# Patient Record
Sex: MALE | Race: WHITE | NOT HISPANIC OR LATINO | URBAN - METROPOLITAN AREA
[De-identification: names, ages, dates, MRNs, and addresses within clinical notes are randomized per-mention and may not be internally consistent; named-entity substitution may affect disease eponyms.]

---

## 2017-07-16 ENCOUNTER — EMERGENCY (EMERGENCY)
Facility: HOSPITAL | Age: 67
LOS: 1 days | End: 2017-07-16
Payer: MEDICARE

## 2017-07-16 PROCEDURE — 99283 EMERGENCY DEPT VISIT LOW MDM: CPT

## 2017-10-08 ENCOUNTER — EMERGENCY (EMERGENCY)
Facility: HOSPITAL | Age: 67
LOS: 1 days | End: 2017-10-08
Payer: MEDICARE

## 2017-10-08 PROCEDURE — 99284 EMERGENCY DEPT VISIT MOD MDM: CPT | Mod: 25

## 2017-10-08 PROCEDURE — 72125 CT NECK SPINE W/O DYE: CPT | Mod: 26

## 2017-10-08 PROCEDURE — 12002 RPR S/N/AX/GEN/TRNK2.6-7.5CM: CPT

## 2017-10-08 PROCEDURE — 70450 CT HEAD/BRAIN W/O DYE: CPT | Mod: 26

## 2017-10-15 ENCOUNTER — EMERGENCY (EMERGENCY)
Facility: HOSPITAL | Age: 67
LOS: 1 days | End: 2017-10-15
Payer: MEDICARE

## 2017-10-15 PROCEDURE — 99285 EMERGENCY DEPT VISIT HI MDM: CPT

## 2017-10-15 PROCEDURE — 70450 CT HEAD/BRAIN W/O DYE: CPT | Mod: 26

## 2018-06-21 ENCOUNTER — APPOINTMENT (OUTPATIENT)
Dept: UROLOGY | Facility: CLINIC | Age: 68
End: 2018-06-21
Payer: MEDICARE

## 2018-06-21 VITALS
SYSTOLIC BLOOD PRESSURE: 143 MMHG | OXYGEN SATURATION: 96 % | DIASTOLIC BLOOD PRESSURE: 81 MMHG | TEMPERATURE: 97.8 F | HEART RATE: 49 BPM

## 2018-06-21 VITALS — HEIGHT: 72 IN | WEIGHT: 210 LBS | BODY MASS INDEX: 28.44 KG/M2

## 2018-06-21 DIAGNOSIS — R97.20 ELEVATED PROSTATE, SPECIFIC ANTIGEN [PSA]: ICD-10-CM

## 2018-06-21 PROBLEM — Z00.00 ENCOUNTER FOR PREVENTIVE HEALTH EXAMINATION: Status: ACTIVE | Noted: 2018-06-21

## 2018-06-21 PROCEDURE — 99204 OFFICE O/P NEW MOD 45 MIN: CPT

## 2018-06-25 LAB — BACTERIA UR CULT: NORMAL

## 2018-06-26 ENCOUNTER — FORM ENCOUNTER (OUTPATIENT)
Age: 68
End: 2018-06-26

## 2018-06-26 ENCOUNTER — OUTPATIENT (OUTPATIENT)
Dept: OUTPATIENT SERVICES | Facility: HOSPITAL | Age: 68
LOS: 1 days | End: 2018-06-26
Payer: MEDICARE

## 2018-06-26 ENCOUNTER — APPOINTMENT (OUTPATIENT)
Dept: MRI IMAGING | Facility: CLINIC | Age: 68
End: 2018-06-26
Payer: MEDICARE

## 2018-06-26 DIAGNOSIS — R97.20 ELEVATED PROSTATE SPECIFIC ANTIGEN [PSA]: ICD-10-CM

## 2018-06-26 LAB — PSA SERPL-MCNC: 5.38 NG/ML

## 2018-06-27 PROCEDURE — A9585: CPT

## 2018-06-27 PROCEDURE — 72197 MRI PELVIS W/O & W/DYE: CPT

## 2018-06-27 PROCEDURE — 82565 ASSAY OF CREATININE: CPT

## 2018-06-27 PROCEDURE — 72197 MRI PELVIS W/O & W/DYE: CPT | Mod: 26

## 2020-03-20 ENCOUNTER — APPOINTMENT (OUTPATIENT)
Dept: UROLOGY | Facility: CLINIC | Age: 70
End: 2020-03-20

## 2020-05-01 ENCOUNTER — APPOINTMENT (OUTPATIENT)
Dept: UROLOGY | Facility: CLINIC | Age: 70
End: 2020-05-01

## 2020-05-29 ENCOUNTER — APPOINTMENT (OUTPATIENT)
Dept: UROLOGY | Facility: CLINIC | Age: 70
End: 2020-05-29
Payer: MEDICARE

## 2020-05-29 VITALS
BODY MASS INDEX: 28.44 KG/M2 | WEIGHT: 210 LBS | HEART RATE: 64 BPM | SYSTOLIC BLOOD PRESSURE: 147 MMHG | DIASTOLIC BLOOD PRESSURE: 72 MMHG | TEMPERATURE: 97.8 F | HEIGHT: 72 IN

## 2020-05-29 PROCEDURE — 99214 OFFICE O/P EST MOD 30 MIN: CPT

## 2020-05-29 NOTE — PHYSICAL EXAM
[General Appearance - Well Developed] : well developed [General Appearance - Well Nourished] : well nourished [Normal Appearance] : normal appearance [Well Groomed] : well groomed [General Appearance - In No Acute Distress] : no acute distress [Abdomen Soft] : soft [Abdomen Tenderness] : non-tender [Urethral Meatus] : meatus normal [Costovertebral Angle Tenderness] : no ~M costovertebral angle tenderness [Urinary Bladder Findings] : the bladder was normal on palpation [Penis Abnormality] : normal circumcised penis [Epididymis] : the epididymides were normal [Scrotum] : the scrotum was normal [Testes Tenderness] : no tenderness of the testes [Testes Mass (___cm)] : there were no testicular masses [Prostate Tenderness] : the prostate was not tender [No Prostate Nodules] : no prostate nodules [Prostate Size ___ gm] : prostate size [unfilled] gm [] : no respiratory distress [Edema] : no peripheral edema [Respiration, Rhythm And Depth] : normal respiratory rhythm and effort [Oriented To Time, Place, And Person] : oriented to person, place, and time [Exaggerated Use Of Accessory Muscles For Inspiration] : no accessory muscle use [Affect] : the affect was normal [Mood] : the mood was normal [Not Anxious] : not anxious [No Palpable Adenopathy] : no palpable adenopathy

## 2020-05-29 NOTE — HISTORY OF PRESENT ILLNESS
[FreeTextEntry1] : CC: Elevated PSA \par \par HPI:\par \par PSA follow up 6.4 ng/ml one week ago;  assuming 68 cc the density 0.09 \par PSA 5.44 ng/ml in January of 2018\par Prior negative biopsy \par MRI PIRAD1, 68 cc, 2018\par \par Associated symptoms: nocturia\par \par FAMHX: negative CAP \par SURGHX: knee and wrist \par SOCIAL: nonsmoker, semi-retired real estate sales, lives in [place] for his whole life\par ROS: Negative GI, RESP, NEURO, ORTHO except arthritis

## 2021-04-29 ENCOUNTER — APPOINTMENT (OUTPATIENT)
Dept: ULTRASOUND IMAGING | Facility: CLINIC | Age: 71
End: 2021-04-29
Payer: MEDICARE

## 2021-04-29 PROCEDURE — 76700 US EXAM ABDOM COMPLETE: CPT

## 2022-11-18 ENCOUNTER — OFFICE (OUTPATIENT)
Dept: URBAN - METROPOLITAN AREA CLINIC 38 | Facility: CLINIC | Age: 72
Setting detail: OPHTHALMOLOGY
End: 2022-11-18
Payer: MEDICARE

## 2022-11-18 DIAGNOSIS — H11.153: ICD-10-CM

## 2022-11-18 DIAGNOSIS — H02.34: ICD-10-CM

## 2022-11-18 DIAGNOSIS — H02.014: ICD-10-CM

## 2022-11-18 DIAGNOSIS — H02.884: ICD-10-CM

## 2022-11-18 DIAGNOSIS — H02.31: ICD-10-CM

## 2022-11-18 PROCEDURE — 99213 OFFICE O/P EST LOW 20 MIN: CPT | Performed by: OPHTHALMOLOGY

## 2022-11-18 PROCEDURE — 67820 REVISE EYELASHES: CPT | Performed by: OPHTHALMOLOGY

## 2022-11-18 ASSESSMENT — REFRACTION_AUTOREFRACTION
OS_AXIS: 093
OD_AXIS: 096
OS_SPHERE: +0.75
OS_CYLINDER: -1.25
OD_SPHERE: +0.50
OD_CYLINDER: -0.75

## 2022-11-18 ASSESSMENT — AXIALLENGTH_DERIVED
OS_AL: 24.4454
OD_AL: 24.3962
OS_AL: 24.4454
OD_AL: 24.3962

## 2022-11-18 ASSESSMENT — LID EXAM ASSESSMENTS
OS_TRICHIASIS: LUL 1+
OD_COMMENTS: BLEPHAROCHALASIS WITH HOODING RUL

## 2022-11-18 ASSESSMENT — SPHEQUIV_DERIVED
OS_SPHEQUIV: 0.125
OS_SPHEQUIV: 0.125
OD_SPHEQUIV: 0.125
OD_SPHEQUIV: 0.125

## 2022-11-18 ASSESSMENT — REFRACTION_MANIFEST
OU_VA: 20/20-
OD_VA1: 20/25-2
OS_AXIS: 095
OS_SPHERE: +0.50
OS_ADD: +2.25
OD_ADD: +2.25
OD_AXIS: 095
OD_CYLINDER: -0.75
OD_VA2: 20/20
OS_VA2: 20/20
OS_CYLINDER: -0.75
OS_VA1: 20/20-
OD_SPHERE: +0.50

## 2022-11-18 ASSESSMENT — KERATOMETRY
OS_K1POWER_DIOPTERS: 40.50
OS_K2POWER_DIOPTERS: 41.75
OD_AXISANGLE_DEGREES: 179
OS_AXISANGLE_DEGREES: 008
OD_K2POWER_DIOPTERS: 41.75
OD_K1POWER_DIOPTERS: 40.75
METHOD_AUTO_MANUAL: AUTO

## 2022-11-18 ASSESSMENT — VISUAL ACUITY
OD_BCVA: 20/40-
OS_BCVA: 20/30-2

## 2022-11-18 ASSESSMENT — CONFRONTATIONAL VISUAL FIELD TEST (CVF)
OD_FINDINGS: FULL
OS_FINDINGS: FULL

## 2023-01-26 ENCOUNTER — APPOINTMENT (OUTPATIENT)
Dept: MRI IMAGING | Facility: CLINIC | Age: 73
End: 2023-01-26
Payer: MEDICARE

## 2023-01-26 ENCOUNTER — OUTPATIENT (OUTPATIENT)
Dept: OUTPATIENT SERVICES | Facility: HOSPITAL | Age: 73
LOS: 1 days | End: 2023-01-26
Payer: MEDICARE

## 2023-01-26 DIAGNOSIS — R97.20 ELEVATED PROSTATE SPECIFIC ANTIGEN [PSA]: ICD-10-CM

## 2023-01-26 DIAGNOSIS — Z00.8 ENCOUNTER FOR OTHER GENERAL EXAMINATION: ICD-10-CM

## 2023-01-26 PROCEDURE — 72197 MRI PELVIS W/O & W/DYE: CPT | Mod: MH

## 2023-01-26 PROCEDURE — 76498P: CUSTOM | Mod: 26,MH

## 2023-01-26 PROCEDURE — 76498 UNLISTED MR PROCEDURE: CPT

## 2023-01-26 PROCEDURE — 72197 MRI PELVIS W/O & W/DYE: CPT | Mod: 26,MH

## 2023-01-26 PROCEDURE — A9585: CPT

## 2023-03-10 ENCOUNTER — APPOINTMENT (OUTPATIENT)
Dept: CT IMAGING | Facility: CLINIC | Age: 73
End: 2023-03-10

## 2023-03-20 ENCOUNTER — APPOINTMENT (OUTPATIENT)
Dept: CT IMAGING | Facility: CLINIC | Age: 73
End: 2023-03-20
Payer: MEDICARE

## 2023-03-20 PROCEDURE — 74176 CT ABD & PELVIS W/O CONTRAST: CPT | Mod: MH

## 2023-09-07 ENCOUNTER — APPOINTMENT (OUTPATIENT)
Dept: MRI IMAGING | Facility: CLINIC | Age: 73
End: 2023-09-07

## 2024-04-17 ENCOUNTER — APPOINTMENT (OUTPATIENT)
Dept: MRI IMAGING | Facility: CLINIC | Age: 74
End: 2024-04-17
Payer: MEDICARE

## 2024-04-17 PROCEDURE — 73221 MRI JOINT UPR EXTREM W/O DYE: CPT | Mod: RT,MH

## 2024-08-05 ENCOUNTER — APPOINTMENT (OUTPATIENT)
Dept: CT IMAGING | Facility: CLINIC | Age: 74
End: 2024-08-05

## 2024-08-05 PROCEDURE — 74178 CT ABD&PLV WO CNTR FLWD CNTR: CPT | Mod: MH

## 2024-10-15 ENCOUNTER — APPOINTMENT (OUTPATIENT)
Dept: MRI IMAGING | Facility: CLINIC | Age: 74
End: 2024-10-15
Payer: MEDICARE

## 2024-10-15 PROCEDURE — 72148 MRI LUMBAR SPINE W/O DYE: CPT | Mod: MH

## 2024-11-09 ENCOUNTER — EMERGENCY (EMERGENCY)
Facility: HOSPITAL | Age: 74
LOS: 1 days | Discharge: HOME | End: 2024-11-09
Attending: EMERGENCY MEDICINE
Payer: MEDICARE

## 2024-11-09 ENCOUNTER — NON-APPOINTMENT (OUTPATIENT)
Age: 74
End: 2024-11-09

## 2024-11-09 VITALS
SYSTOLIC BLOOD PRESSURE: 127 MMHG | OXYGEN SATURATION: 95 % | DIASTOLIC BLOOD PRESSURE: 68 MMHG | RESPIRATION RATE: 16 BRPM | TEMPERATURE: 98 F | WEIGHT: 179.9 LBS | HEART RATE: 64 BPM

## 2024-11-09 VITALS
HEART RATE: 62 BPM | OXYGEN SATURATION: 97 % | RESPIRATION RATE: 16 BRPM | DIASTOLIC BLOOD PRESSURE: 68 MMHG | SYSTOLIC BLOOD PRESSURE: 134 MMHG

## 2024-11-09 LAB
ALBUMIN SERPL ELPH-MCNC: 3.8 G/DL — SIGNIFICANT CHANGE UP (ref 3.3–5.2)
ALP SERPL-CCNC: 55 U/L — SIGNIFICANT CHANGE UP (ref 40–120)
ALT FLD-CCNC: 15 U/L — SIGNIFICANT CHANGE UP
ANION GAP SERPL CALC-SCNC: 15 MMOL/L — SIGNIFICANT CHANGE UP (ref 5–17)
APTT BLD: 36.3 SEC — HIGH (ref 24.5–35.6)
AST SERPL-CCNC: 19 U/L — SIGNIFICANT CHANGE UP
BASOPHILS # BLD AUTO: 0.03 K/UL — SIGNIFICANT CHANGE UP (ref 0–0.2)
BASOPHILS NFR BLD AUTO: 0.5 % — SIGNIFICANT CHANGE UP (ref 0–2)
BILIRUB SERPL-MCNC: 0.2 MG/DL — LOW (ref 0.4–2)
BUN SERPL-MCNC: 11.6 MG/DL — SIGNIFICANT CHANGE UP (ref 8–20)
CALCIUM SERPL-MCNC: 8.6 MG/DL — SIGNIFICANT CHANGE UP (ref 8.4–10.5)
CHLORIDE SERPL-SCNC: 107 MMOL/L — SIGNIFICANT CHANGE UP (ref 96–108)
CO2 SERPL-SCNC: 21 MMOL/L — LOW (ref 22–29)
CREAT SERPL-MCNC: 0.74 MG/DL — SIGNIFICANT CHANGE UP (ref 0.5–1.3)
EGFR: 94 ML/MIN/1.73M2 — SIGNIFICANT CHANGE UP
EOSINOPHIL # BLD AUTO: 0.2 K/UL — SIGNIFICANT CHANGE UP (ref 0–0.5)
EOSINOPHIL NFR BLD AUTO: 3 % — SIGNIFICANT CHANGE UP (ref 0–6)
GLUCOSE SERPL-MCNC: 110 MG/DL — HIGH (ref 70–99)
HCT VFR BLD CALC: 41.4 % — SIGNIFICANT CHANGE UP (ref 39–50)
HGB BLD-MCNC: 14.3 G/DL — SIGNIFICANT CHANGE UP (ref 13–17)
IMM GRANULOCYTES NFR BLD AUTO: 0.3 % — SIGNIFICANT CHANGE UP (ref 0–0.9)
INR BLD: 0.95 RATIO — SIGNIFICANT CHANGE UP (ref 0.85–1.16)
LYMPHOCYTES # BLD AUTO: 2.06 K/UL — SIGNIFICANT CHANGE UP (ref 1–3.3)
LYMPHOCYTES # BLD AUTO: 31 % — SIGNIFICANT CHANGE UP (ref 13–44)
MCHC RBC-ENTMCNC: 31.8 PG — SIGNIFICANT CHANGE UP (ref 27–34)
MCHC RBC-ENTMCNC: 34.5 G/DL — SIGNIFICANT CHANGE UP (ref 32–36)
MCV RBC AUTO: 92.2 FL — SIGNIFICANT CHANGE UP (ref 80–100)
MONOCYTES # BLD AUTO: 0.63 K/UL — SIGNIFICANT CHANGE UP (ref 0–0.9)
MONOCYTES NFR BLD AUTO: 9.5 % — SIGNIFICANT CHANGE UP (ref 2–14)
NEUTROPHILS # BLD AUTO: 3.7 K/UL — SIGNIFICANT CHANGE UP (ref 1.8–7.4)
NEUTROPHILS NFR BLD AUTO: 55.7 % — SIGNIFICANT CHANGE UP (ref 43–77)
PLATELET # BLD AUTO: 189 K/UL — SIGNIFICANT CHANGE UP (ref 150–400)
POTASSIUM SERPL-MCNC: 3.8 MMOL/L — SIGNIFICANT CHANGE UP (ref 3.5–5.3)
POTASSIUM SERPL-SCNC: 3.8 MMOL/L — SIGNIFICANT CHANGE UP (ref 3.5–5.3)
PROT SERPL-MCNC: 6.1 G/DL — LOW (ref 6.6–8.7)
PROTHROM AB SERPL-ACNC: 11 SEC — SIGNIFICANT CHANGE UP (ref 9.9–13.4)
RBC # BLD: 4.49 M/UL — SIGNIFICANT CHANGE UP (ref 4.2–5.8)
RBC # FLD: 11.7 % — SIGNIFICANT CHANGE UP (ref 10.3–14.5)
SODIUM SERPL-SCNC: 143 MMOL/L — SIGNIFICANT CHANGE UP (ref 135–145)
TROPONIN T, HIGH SENSITIVITY RESULT: 10 NG/L — SIGNIFICANT CHANGE UP (ref 0–51)
TROPONIN T, HIGH SENSITIVITY RESULT: 11 NG/L — SIGNIFICANT CHANGE UP (ref 0–51)
WBC # BLD: 6.64 K/UL — SIGNIFICANT CHANGE UP (ref 3.8–10.5)
WBC # FLD AUTO: 6.64 K/UL — SIGNIFICANT CHANGE UP (ref 3.8–10.5)

## 2024-11-09 PROCEDURE — 70450 CT HEAD/BRAIN W/O DYE: CPT | Mod: MC

## 2024-11-09 PROCEDURE — 71045 X-RAY EXAM CHEST 1 VIEW: CPT

## 2024-11-09 PROCEDURE — 85025 COMPLETE CBC W/AUTO DIFF WBC: CPT

## 2024-11-09 PROCEDURE — 93005 ELECTROCARDIOGRAM TRACING: CPT

## 2024-11-09 PROCEDURE — 99285 EMERGENCY DEPT VISIT HI MDM: CPT | Mod: 25

## 2024-11-09 PROCEDURE — 93010 ELECTROCARDIOGRAM REPORT: CPT

## 2024-11-09 PROCEDURE — 70498 CT ANGIOGRAPHY NECK: CPT | Mod: 26,MC

## 2024-11-09 PROCEDURE — 72170 X-RAY EXAM OF PELVIS: CPT | Mod: 26

## 2024-11-09 PROCEDURE — 80053 COMPREHEN METABOLIC PANEL: CPT

## 2024-11-09 PROCEDURE — 36415 COLL VENOUS BLD VENIPUNCTURE: CPT

## 2024-11-09 PROCEDURE — 85730 THROMBOPLASTIN TIME PARTIAL: CPT

## 2024-11-09 PROCEDURE — 84484 ASSAY OF TROPONIN QUANT: CPT

## 2024-11-09 PROCEDURE — 70450 CT HEAD/BRAIN W/O DYE: CPT | Mod: 26,MC,XU

## 2024-11-09 PROCEDURE — 70496 CT ANGIOGRAPHY HEAD: CPT | Mod: MC

## 2024-11-09 PROCEDURE — 70496 CT ANGIOGRAPHY HEAD: CPT | Mod: 26,MC

## 2024-11-09 PROCEDURE — 72170 X-RAY EXAM OF PELVIS: CPT

## 2024-11-09 PROCEDURE — 99285 EMERGENCY DEPT VISIT HI MDM: CPT | Mod: GC

## 2024-11-09 PROCEDURE — 70498 CT ANGIOGRAPHY NECK: CPT | Mod: MC

## 2024-11-09 PROCEDURE — 71045 X-RAY EXAM CHEST 1 VIEW: CPT | Mod: 26

## 2024-11-09 PROCEDURE — 85610 PROTHROMBIN TIME: CPT

## 2024-11-09 PROCEDURE — 99283 EMERGENCY DEPT VISIT LOW MDM: CPT

## 2024-11-09 NOTE — H&P ADULT - ATTENDING COMMENTS
74[place]year-[place] [facility] [place] s/p unwitnessed fall found to have traumatic subdural hemorrhage   - Repeat CT head   - Appreciate NSx input   - Orthostatics   - EKG/troponin   - CXR/PXR   - PT evaluation  - Home vs admission pending above.

## 2024-11-09 NOTE — CONSULT NOTE ADULT - NS ATTEND AMEND GEN_ALL_CORE FT
I agree with the above. I personally examined and saw the patient. Neurointact, scan stable, plan as above.

## 2024-11-09 NOTE — ED ADULT NURSE NOTE - CHIEF COMPLAINT QUOTE
pt BIBEMS transfer from [facility] for small SDH s/p fall hematoma note to forehead GCS 15 on arrival placed in CC for further evaluation

## 2024-11-09 NOTE — ED ADULT TRIAGE NOTE - CHIEF COMPLAINT QUOTE
pt BIBEMS transfer from [facility] for small SDH s/p fall hematoma note to forehead GCS 15 on arrival placed in CC for further evaluation

## 2024-11-09 NOTE — ED PROVIDER NOTE - PHYSICAL EXAMINATION
Gen: NAD, non-toxic, conversational  Eyes: PERRLA, EOMI   HENT: Normocephalic, left sided temple abrasion. External ears normal, no rhinorrhea, moist mucous membranes.   CV: RRR, no M/R/G  Resp: CTAB, non-labored, speaking without difficulty on room air  Abd: soft, non tender, non rigid, no guarding or rebound tenderness  Back: No CVAT bilaterally, no midline ttp  Skin: dry, wwp, left base of thenar eminence with ~1cm circular abrasion no active bleeding   Neuro: AOx3, speech is fluent and appropriate  Psych: Mood ok, affect euthymic

## 2024-11-09 NOTE — ED ADULT NURSE REASSESSMENT NOTE - NS ED NURSE REASSESS COMMENT FT1
pt seen and cleared by neuro sx, no change in ct scan, no symptoms, completely neuro intact. pt DC but ED and neuro, pt understands need for return to ED based on symptoms. family @ bedside as well, pt ambulatory with steady gait to bathroom and pt OK fir discharge home.
pt received in shift change report at this time, pt returned from repeat CT scan and currently awaiting results. pt AOX3, HIGH, GCS 15. NSR on monitor with otherwise stable vital signs. even and unlabored resps, no vision changes no headache and no reports of pain. pt skin warm dry and intact, IV site patent.

## 2024-11-09 NOTE — ED PROVIDER NOTE - OBJECTIVE STATEMENT
74-year-old male status post fall with finding of left-sided subdural 0.5 cm transferred from [facility], also with left base of thumb abrasion that had negative x-ray, also negative chest x-ray performed at [facility] [place].  Sent here for neurosurgical as well as trauma evaluation.  Patient states that he is not in current pain, no nausea, has not been vomiting.  Feels okay currently, declines analgesics.  Denies other symptoms of acute concern.

## 2024-11-09 NOTE — ED PROVIDER NOTE - CARE PROVIDER_API CALL
Valery Richards  Neurosurgery  [address] [place] [id number]  Phone: [phone]  Fax: [phone]  Follow Up Time:

## 2024-11-09 NOTE — CONSULT NOTE ADULT - ASSESSMENT
Assessment:  74M PMHx GERD, vertigo presents as a [place] [facility] [place]. Denies AC/AP use, but takes 400mg Advil daily for R rotator cuff tear. Of note patient reports feeling off-balanced for the past few years and was told he has "some obstruction in his spine that is causing fluid to back up in his brain." CT Head at [facility] demonstrates 5mm left cerebral convexity SDH and prominence of ventricular system.     Plan:  - No role for emergent neurosurgical intervention  - q1 neuro checks until stability scan  - 6hr repeat CTH @ 6AM  - Trauma evaluation  - Pain control prn; avoid oversedation  - SBP < 160  - Hold AC/AP  - If stable scan, ok to dispo from NSx standpoint, follow up in 1 week with Dr. Richards  - Discussed with Dr. Richards Assessment:  74M PMHx GERD, vertigo presents as a transfer from [facility] [place] s/p fall. Denies AC/AP use, but takes 400mg Advil daily for R rotator cuff tear. Of note patient reports feeling off-balanced for the past few years and was told he has "some obstruction in his spine that is causing fluid to back up in his brain." CT Head at [facility] [place] demonstrates 5mm left cerebral convexity SDH and prominence of ventricular system.     Plan:  - No role for emergent neurosurgical intervention  - q1 neuro checks until stability scan  - 6hr repeat CTH @ 6AM  - Keppra 250 BID x 7 days  - Trauma evaluation  - Pain control prn; avoid oversedation  - SBP < 160  - Hold AC/AP  - If stable scan, ok to dispo from NSx standpoint, follow up in 1 week with Dr. Richards  - Discussed with Dr. Richards Assessment:  74M PMHx GERD, vertigo presents as a transfer from [facility] [place]/p fall. Denies AC/AP use, but takes 400mg Advil daily for R rotator cuff tear. Of note patient reports feeling off-balanced for the past few years and was told he has "some obstruction in his spine that is causing fluid to back up in his brain." CT Head at [facility] demonstrates 5mm left cerebral convexity SDH and prominence of ventricular system.     Plan:  - No role for emergent neurosurgical intervention  - q1 neuro checks until stability scan  - 6hr repeat CTH @ 6AM  - Keppra 250 BID x 7 days  - Trauma evaluation  - Pain control prn; avoid oversedation  - SBP < 160  - Hold AC/AP  - If stable scan, ok to dispo from NSx standpoint, follow up in 1 week with Dr. Richards for outpatient workup for NPH  - Discussed with Dr. Richards Assessment:  74M PMHx GERD, vertigo presents as a transfer from [facility] [place]/p fall. Denies AC/AP use, but takes 400mg Advil daily for R rotator cuff tear. Of note patient reports feeling off-balanced for the past few years and was told he has "some obstruction in his spine that is causing fluid to back up in his brain." CT Head at [facility] demonstrates 5mm left cerebral convexity SDH and prominence of ventricular system.     Plan:  - No role for emergent neurosurgical intervention  - q1 neuro checks until stability scan  - 6hr repeat CTH @ 6AM  - Keppra 250 BID x 7 days  - Trauma evaluation  - Pain control prn; avoid oversedation  - SBP < 160  - Hold AC/AP  - If stable scan, ok to dispo from NSx standpoint, follow up in 1 week with Dr. Richards for outpatient workup for NPH  - Discussed with Dr. Richards    ADDENDUM:  6 hour repeat scan stable, ok for discharge. Outpatient follow up in 2 weeks. Hold NSAIDs/AC/AP/supplements

## 2024-11-09 NOTE — ED PROVIDER NOTE - PATIENT PORTAL LINK FT
You can access the FollowMyHealth Patient Portal offered by [facility] by registering at the following website: [email or web address]. By joining [employer]’s FollowMyHealth portal, you will also be able to view your health information using other applications (apps) compatible with our system.

## 2024-11-09 NOTE — CONSULT NOTE ADULT - SUBJECTIVE AND OBJECTIVE BOX
HPI:  74 year-old male PMHx GERD, vertigo presents as a transfer from [facility] s/p fall. Pt states he was drinking earlier tonight, was getting out of bed to go to the bathroom and fell and hit his head on the floor with loss of consciousness. Denies AC/AP use, but admits to taking 400mg Advil daily for R rotator cuff tear. Of note patient reports feeling off-balanced for the past few years and was told he has "some obstruction in his spine that is causing fluid to back up in his brain." Per pt he saw a neurologist in [place] who said there was nothing to do. CT Head at [facility] demonstrates 5mm left cerebral convexity SDH and prominence of ventricular system. Has a slight headache, but otherwise feels well. Denies visual disturbances, numbness, weakness, N/V.     PAST MEDICAL & SURGICAL HISTORY:  Vertigo  GERD    No significant past surgical history      SOCIAL HISTORY:  Tobacco Use: Denies  EtOH use: 2-3 glasses of wine 3x a week  Substance: Denies    Allergies    No Known Allergies    Intolerances        REVIEW OF SYSTEMS  Negative except as noted in HPI    HOME MEDICATIONS:  Home Medications:        Vital Signs Last 24 Hrs  T(C): 36.5 (09 Nov 2024 02:59), Max: 36.5 (09 Nov 2024 02:59)  T(F): 97.7 (09 Nov 2024 02:59), Max: 97.7 (09 Nov 2024 02:59)  HR: 64 (09 Nov 2024 02:59) (64 - 64)  BP: 127/68 (09 Nov 2024 02:59) (127/68 - 127/68)  BP(mean): 86 (09 Nov 2024 02:59) (86 - 86)  RR: 16 (09 Nov 2024 02:59) (16 - 16)  SpO2: 95% (09 Nov 2024 02:59) (95% - 95%)    Parameters below as of 09 Nov 2024 02:59  Patient On (Oxygen Delivery Method): room air      PHYSICAL EXAM:  GENERAL: NAD, well-groomed  HEAD:  L-sided temporal abrasion  YUE COMA SCORE: E-4 V-5 M-6 = 15  MENTAL STATUS: AAO x3; Awake; Opens eyes spontaneously; Appropriately conversant without aphasia; following simple commands  CRANIAL NERVES: PERRL. EOMI without nystagmus. Facial sensation intact V1-3 distribution b/l. Face symmetric, tongue midline. Hearing grossly intact. Speech clear.   MOTOR: strength 5/5 b/l upper and lower extremities  SENSATION: grossly intact to light touch all extremities  CHEST/LUNG: Non-labored breathing on room air   SKIN: Warm, dry        RADIOLOGY & ADDITIONAL STUDIES:  11/9/24 [facility] CTH:  IMPRESSION:    CT BRAIN:  Acute subdural hematoma noted along the left cerebral convexity with a maximal width of 0.5 cm. No midline shift.    Prominence of the ventricular system, out of proportion to the surrounding sulci which may represent normal pressure hydrocephalus. MRI of the brain can be obtained for further evaluation.    Left frontal scalp hematoma. There is no depressed calvarial fracture. Age-indeterminate nasal bone fractures.    CT CERVICAL SPINE:    No acute cervical fracture or traumatic malalignment.    MRI would be required to evaluate the ligamentous structures at higher sensitivity as well as for better evaluation of the cervical canal and its contents.

## 2024-11-09 NOTE — ED ADULT NURSE NOTE - OBJECTIVE STATEMENT
Patient presents to  area of [place] [facility] [place].  Pt A&Ox4, GCS 15 with no neuro deficits on arrival.  Pt noted with small hematoma to left side of head, left thumb bruising with abrasion present.  Pt denie ssob/chest pain, respirations even and unlabored, negative JVD/diaphoresis.  Plan at this time for repeat CT scan at 0630, neurosurg and trauma consults.  No complaints voiced by pt at this time, spouse present at bedside.  Cardiac monitor in place.

## 2024-11-09 NOTE — H&P ADULT - NSICDXPASTMEDICALHX_GEN_ALL_CORE_FT
PAST MEDICAL HISTORY:  Vertigo      [Follow-Up] : a follow-up visit for [S/P Cardiac Surgery] : status post cardiac surgery [Marfan Syndrome] : Marfan syndrome [Mitral Valve Prolapse] : mitral valve prolapse [Father] : father [FreeTextEntry3] : dilated aortic root

## 2024-11-09 NOTE — ED PROVIDER NOTE - CLINICAL SUMMARY MEDICAL DECISION MAKING FREE TEXT BOX
74-year-old male [place] [facility] [place] found to have a 0.5 cm subdural hematoma on the left side status post fall, has a hand abrasion though had negative x[place] [facility], trauma as well as neurosurgical services called after initial evaluation, recommending repeat head CT in 6 hours.  This is ordered.  Will follow-up studies, recommendations, disposition pending clinical course and results.

## 2024-11-09 NOTE — H&P ADULT - HISTORY OF PRESENT ILLNESS
HPI: 74y [place] [facility] [place] of [place] and GERD presents after sustaining GLF around ~2300H the night of presentation. Patient is not maintained on any anticoagulation. He states that he hit the left side of his head and thinks he lost consciousness The fall was not witnessed but heard by his wife who found him. [place] [facility] [place] C spine done which revealed small left SDH. Patient transferred for further NSGY and trauma evaluation. Patient VSS, afebrile and he complains of mild headache at time of evaluation.       PAST MEDICAL & SURGICAL HISTORY:  No pertinent past medical history      No significant past surgical history        Home Meds: Home Medications:    Allergies: Allergies    No Known Allergies    Intolerances      Soc:   Advanced Directives: Presumed Full Code     CURRENT MEDICATIONS:   --------------------------------------------------------------------------------------  Neurologic Medications    Respiratory Medications    Cardiovascular Medications    Gastrointestinal Medications    Genitourinary Medications    Hematologic/Oncologic Medications    Antimicrobial/Immunologic Medications    Endocrine/Metabolic Medications    Topical/Other Medications    --------------------------------------------------------------------------------------    VITAL SIGNS, INS/OUTS (last 24 hours):  --------------------------------------------------------------------------------------  ICU Vital Signs Last 24 Hrs  T(C): 36.5 (09 Nov 2024 02:59), Max: 36.5 (09 Nov 2024 02:59)  T(F): 97.7 (09 Nov 2024 02:59), Max: 97.7 (09 Nov 2024 02:59)  HR: 64 (09 Nov 2024 02:59) (64 - 64)  BP: 127/68 (09 Nov 2024 02:59) (127/68 - 127/68)  BP(mean): 86 (09 Nov 2024 02:59) (86 - 86)  ABP: --  ABP(mean): --  RR: 16 (09 Nov 2024 02:59) (16 - 16)  SpO2: 95% (09 Nov 2024 02:59) (95% - 95%)    O2 Parameters below as of 09 Nov 2024 02:59  Patient On (Oxygen Delivery Method): room air          I&O's Summary    --------------------------------------------------------------------------------------    EXAM:  Constitutional: patient appears comfortable resting in bed, in no apparent distress  HEENT: head normocephalic , superficial abrasion to left forehead, no blood in nares or oral cavity  Respiratory: respirations are unlabored, no accessory muscle use, no conversational dyspnea  Gastrointestinal: abdomen is soft & non-distended, non-tender, no rebound tenderness / guarding  Neurological: GCS15, A&O x 3  Musculoskeletal: HIGH x 4 spontaneously, extremities are without point tenderness or deformity  Skin: abrasion to left front forehead and left base of thumb. both hemostatic  LABS  --------------------------------------------------------------------------------------  Labs:  CAPILLARY BLOOD GLUCOSE                              14.3   6.64  )-----------( 189      ( 09 Nov 2024 05:00 )             41.4       Auto Neutrophil %: 55.7 % (11-09-24 @ 05:00)  Auto Immature Granulocyte %: 0.3 % (11-09-24 @ 05:00)    11-09    143  |  107  |  11.6  ----------------------------<  110[H]  3.8   |  21.0[L]  |  0.74      Calcium: 8.6 mg/dL (11-09-24 @ 05:00)      LFTs:             6.1  | 0.2  | 19       ------------------[55      ( 09 Nov 2024 05:00 )  3.8  | x    | 15          Lipase:x      Amylase:x             Coags:     11.0   ----< 0.95    ( 09 Nov 2024 05:00 )     36.3

## 2024-11-09 NOTE — ED PROVIDER NOTE - PROGRESS NOTE DETAILS
AJM: pt receive din sign out. feeling well. cleared by yasmine SMITH. syncope workup neg. stable for dc. All results discussed with the patient, and a copy of results has been provided. Patient is comfortable with dc plan for home. Opportunity for questions was provided and all questions have been addressed. Patient understands when to return to ED if symptoms worsen.

## 2024-11-09 NOTE — ED ADULT NURSE NOTE - NSFALLHARMRISKINTERV_ED_ALL_ED

## 2024-11-09 NOTE — ED PROVIDER NOTE - NSFOLLOWUPINSTRUCTIONS_ED_ALL_ED_FT
Follow-up with neurosurgery and 1 to 2 weeks.  Return to the emergency department for any worsening symptoms such as headache, confusion, focal weakness or numbness, difficulty speaking or swallowing or any other worsening symptoms.

## 2024-11-09 NOTE — H&P ADULT - ASSESSMENT
74y Male trauma transfer from [facility] with history of vertigo and GERD presents after sustaining GLF around ~2300H the night of presentation. Patient is not maintained on any anticoagulation. He states that he hit the left side of his head and thinks he lost consciousness The fall was not witnessed but heard by his wife who found him. [place] [facility] [place] done which revealed small left SDH. Patient transferred for further NSGY and trauma evaluation. Patient VSS, afebrile and he complains of mild headache at time of evaluation.     Plan:  - Appreicate NSGY recs - repeat CTH at 6:30am, if stable can f/u outpatient  - Please also obtain CTA H & N with rpt CTH, PXR & CXR  - Ensure adequate pain control  - NPO until repeat scan completed  - Syncope workup    Patient seen and discussed with Dr. Mak

## 2024-11-11 PROBLEM — S06.5XAA SUBDURAL HEMATOMA: Status: ACTIVE | Noted: 2024-11-11

## 2024-11-20 ENCOUNTER — NON-APPOINTMENT (OUTPATIENT)
Age: 74
End: 2024-11-20

## 2024-11-20 ENCOUNTER — APPOINTMENT (OUTPATIENT)
Age: 74
End: 2024-11-20
Payer: MEDICARE

## 2024-11-20 VITALS
HEART RATE: 63 BPM | HEIGHT: 72 IN | WEIGHT: 198 LBS | DIASTOLIC BLOOD PRESSURE: 72 MMHG | OXYGEN SATURATION: 96 % | SYSTOLIC BLOOD PRESSURE: 141 MMHG | TEMPERATURE: 97.5 F | BODY MASS INDEX: 26.82 KG/M2

## 2024-11-20 DIAGNOSIS — Z78.9 OTHER SPECIFIED HEALTH STATUS: ICD-10-CM

## 2024-11-20 PROCEDURE — 99214 OFFICE O/P EST MOD 30 MIN: CPT

## 2024-11-20 RX ORDER — OMEPRAZOLE 20 MG/1
20 CAPSULE, DELAYED RELEASE ORAL
Refills: 0 | Status: ACTIVE | COMMUNITY

## 2024-11-21 PROBLEM — R42 DIZZINESS AND GIDDINESS: Chronic | Status: ACTIVE | Noted: 2024-11-09

## 2024-11-22 ENCOUNTER — APPOINTMENT (OUTPATIENT)
Dept: CT IMAGING | Facility: CLINIC | Age: 74
End: 2024-11-22
Payer: MEDICARE

## 2024-11-22 DIAGNOSIS — S06.5XAA TRAUMATIC SUBDURAL HEMORRHAGE WITH LOSS OF CONSCIOUSNESS STATUS UNKNOWN, INITIAL ENCOUNTER: ICD-10-CM

## 2024-11-22 PROCEDURE — 70450 CT HEAD/BRAIN W/O DYE: CPT | Mod: MH

## 2024-12-13 ENCOUNTER — APPOINTMENT (OUTPATIENT)
Dept: CT IMAGING | Facility: CLINIC | Age: 74
End: 2024-12-13
Payer: MEDICARE

## 2024-12-13 PROCEDURE — 70450 CT HEAD/BRAIN W/O DYE: CPT | Mod: MH

## 2024-12-17 ENCOUNTER — APPOINTMENT (OUTPATIENT)
Dept: NEUROSURGERY | Facility: CLINIC | Age: 74
End: 2024-12-17
Payer: MEDICARE

## 2024-12-17 VITALS
HEART RATE: 54 BPM | OXYGEN SATURATION: 97 % | TEMPERATURE: 97.9 F | DIASTOLIC BLOOD PRESSURE: 78 MMHG | WEIGHT: 200 LBS | HEIGHT: 72 IN | SYSTOLIC BLOOD PRESSURE: 148 MMHG | BODY MASS INDEX: 27.09 KG/M2

## 2024-12-17 DIAGNOSIS — Z87.898 PERSONAL HISTORY OF OTHER SPECIFIED CONDITIONS: ICD-10-CM

## 2024-12-17 PROCEDURE — 99213 OFFICE O/P EST LOW 20 MIN: CPT

## 2024-12-24 PROBLEM — F10.90 ALCOHOL USE: Status: ACTIVE | Noted: 2024-11-20

## 2024-12-30 ENCOUNTER — APPOINTMENT (OUTPATIENT)
Dept: CT IMAGING | Facility: CLINIC | Age: 74
End: 2024-12-30
Payer: MEDICARE

## 2024-12-30 PROCEDURE — 70450 CT HEAD/BRAIN W/O DYE: CPT | Mod: MH

## 2024-12-31 ENCOUNTER — APPOINTMENT (OUTPATIENT)
Dept: NEUROSURGERY | Facility: CLINIC | Age: 74
End: 2024-12-31

## 2025-01-04 ENCOUNTER — OFFICE (OUTPATIENT)
Dept: URBAN - METROPOLITAN AREA CLINIC 38 | Facility: CLINIC | Age: 75
Setting detail: OPHTHALMOLOGY
End: 2025-01-04
Payer: MEDICARE

## 2025-01-04 DIAGNOSIS — H43.23: ICD-10-CM

## 2025-01-04 DIAGNOSIS — H11.153: ICD-10-CM

## 2025-01-04 DIAGNOSIS — H40.003: ICD-10-CM

## 2025-01-04 DIAGNOSIS — H25.13: ICD-10-CM

## 2025-01-04 PROCEDURE — 92014 COMPRE OPH EXAM EST PT 1/>: CPT

## 2025-01-04 PROCEDURE — 92133 CPTRZD OPH DX IMG PST SGM ON: CPT

## 2025-01-04 ASSESSMENT — VISUAL ACUITY
OD_BCVA: 20/30-1
OS_BCVA: 20/30

## 2025-01-04 ASSESSMENT — REFRACTION_AUTOREFRACTION
OD_CYLINDER: -0.75
OS_CYLINDER: -1.25
OD_AXIS: 092
OD_SPHERE: PLANO
OS_SPHERE: +0.75
OS_AXIS: 092

## 2025-01-04 ASSESSMENT — REFRACTION_CURRENTRX
OD_VPRISM_DIRECTION: SV
OD_ADD: +1.25
OD_OVR_VA: 20/
OS_VPRISM_DIRECTION: SV
OS_ADD: +1.25
OS_OVR_VA: 20/

## 2025-01-04 ASSESSMENT — KERATOMETRY
OD_AXISANGLE_DEGREES: 003
OS_AXISANGLE_DEGREES: 003
OS_K1POWER_DIOPTERS: 40.50
OS_K2POWER_DIOPTERS: 41.75
OD_K2POWER_DIOPTERS: 41.75
OD_K1POWER_DIOPTERS: 40.50
METHOD_AUTO_MANUAL: AUTO

## 2025-01-04 ASSESSMENT — REFRACTION_MANIFEST
OS_SPHERE: +0.50
OD_VA2: 20/20
OD_CYLINDER: -0.75
OS_VA1: 20/25-2
OD_SPHERE: -0.50
OD_AXIS: 095
OS_VA2: 20/20
OD_ADD: +2.75
OS_ADD: +2.75
OS_AXIS: 095
OS_CYLINDER: -1.50
OU_VA: 20/30-2
OD_VA1: 20/25-2

## 2025-01-04 ASSESSMENT — CONFRONTATIONAL VISUAL FIELD TEST (CVF)
OD_FINDINGS: FULL
OS_FINDINGS: FULL

## 2025-01-04 ASSESSMENT — LID EXAM ASSESSMENTS
OD_COMMENTS: BLEPHAROCHALASIS WITH HOODING RUL
OS_COMMENTS: BLEPHAROCHALASIS WITH HOODING LUL

## 2025-01-04 ASSESSMENT — TONOMETRY: OS_IOP_MMHG: 19

## 2025-01-28 ENCOUNTER — APPOINTMENT (OUTPATIENT)
Dept: CT IMAGING | Facility: CLINIC | Age: 75
End: 2025-01-28
Payer: MEDICARE

## 2025-01-28 PROCEDURE — 70450 CT HEAD/BRAIN W/O DYE: CPT

## 2025-01-30 ENCOUNTER — APPOINTMENT (OUTPATIENT)
Dept: NEUROSURGERY | Facility: CLINIC | Age: 75
End: 2025-01-30
Payer: MEDICARE

## 2025-01-30 ENCOUNTER — NON-APPOINTMENT (OUTPATIENT)
Age: 75
End: 2025-01-30

## 2025-01-30 VITALS
HEIGHT: 72 IN | WEIGHT: 200 LBS | SYSTOLIC BLOOD PRESSURE: 142 MMHG | OXYGEN SATURATION: 98 % | DIASTOLIC BLOOD PRESSURE: 81 MMHG | BODY MASS INDEX: 27.09 KG/M2 | TEMPERATURE: 97.9 F | HEART RATE: 72 BPM

## 2025-01-30 DIAGNOSIS — M54.2 CERVICALGIA: ICD-10-CM

## 2025-01-30 PROCEDURE — 99212 OFFICE O/P EST SF 10 MIN: CPT

## 2025-04-01 ENCOUNTER — APPOINTMENT (OUTPATIENT)
Dept: CT IMAGING | Facility: CLINIC | Age: 75
End: 2025-04-01
Payer: MEDICARE

## 2025-04-01 PROCEDURE — 70450 CT HEAD/BRAIN W/O DYE: CPT

## 2025-04-03 ENCOUNTER — APPOINTMENT (OUTPATIENT)
Dept: NEUROSURGERY | Facility: CLINIC | Age: 75
End: 2025-04-03
Payer: MEDICARE

## 2025-04-03 DIAGNOSIS — S06.5XAA TRAUMATIC SUBDURAL HEMORRHAGE WITH LOSS OF CONSCIOUSNESS STATUS UNKNOWN, INITIAL ENCOUNTER: ICD-10-CM

## 2025-04-03 PROCEDURE — 99212 OFFICE O/P EST SF 10 MIN: CPT | Mod: 93

## 2025-04-09 ENCOUNTER — OFFICE (OUTPATIENT)
Dept: URBAN - METROPOLITAN AREA CLINIC 38 | Facility: CLINIC | Age: 75
Setting detail: OPHTHALMOLOGY
End: 2025-04-09
Payer: MEDICARE

## 2025-04-09 DIAGNOSIS — H40.003: ICD-10-CM

## 2025-04-09 PROCEDURE — 99213 OFFICE O/P EST LOW 20 MIN: CPT

## 2025-04-09 PROCEDURE — 92083 EXTENDED VISUAL FIELD XM: CPT

## 2025-04-09 ASSESSMENT — REFRACTION_AUTOREFRACTION
OS_CYLINDER: -1.25
OS_AXIS: 092
OD_CYLINDER: -0.75
OD_AXIS: 092
OD_SPHERE: PLANO
OS_SPHERE: +0.75

## 2025-04-09 ASSESSMENT — REFRACTION_CURRENTRX
OD_VPRISM_DIRECTION: SV
OS_OVR_VA: 20/
OS_VPRISM_DIRECTION: SV
OS_ADD: +1.25
OD_ADD: +1.25
OD_OVR_VA: 20/

## 2025-04-09 ASSESSMENT — REFRACTION_MANIFEST
OD_VA1: 20/25-2
OS_SPHERE: +0.50
OD_CYLINDER: -0.75
OD_ADD: +2.75
OS_ADD: +2.75
OS_AXIS: 095
OS_CYLINDER: -1.50
OS_VA1: 20/25-2
OD_VA2: 20/20
OD_AXIS: 095
OS_VA2: 20/20
OU_VA: 20/30-2
OD_SPHERE: -0.50

## 2025-04-09 ASSESSMENT — TONOMETRY
OD_IOP_MMHG: 20
OS_IOP_MMHG: 20

## 2025-04-09 ASSESSMENT — KERATOMETRY
OS_K1POWER_DIOPTERS: 40.50
OS_K2POWER_DIOPTERS: 41.75
OS_AXISANGLE_DEGREES: 003
OD_K1POWER_DIOPTERS: 40.50
OD_AXISANGLE_DEGREES: 003
OD_K2POWER_DIOPTERS: 41.75
METHOD_AUTO_MANUAL: AUTO

## 2025-04-09 ASSESSMENT — CONFRONTATIONAL VISUAL FIELD TEST (CVF)
OS_FINDINGS: FULL
OD_FINDINGS: FULL

## 2025-04-09 ASSESSMENT — VISUAL ACUITY
OD_BCVA: 20/30-2
OS_BCVA: 20/30-

## 2025-04-09 ASSESSMENT — LID EXAM ASSESSMENTS
OS_COMMENTS: BLEPHAROCHALASIS WITH HOODING LUL
OD_COMMENTS: BLEPHAROCHALASIS WITH HOODING RUL

## 2025-08-04 ENCOUNTER — APPOINTMENT (OUTPATIENT)
Dept: CT IMAGING | Facility: CLINIC | Age: 75
End: 2025-08-04
Payer: MEDICARE

## 2025-08-04 PROCEDURE — 74177 CT ABD & PELVIS W/CONTRAST: CPT | Mod: TC
